# Patient Record
Sex: FEMALE | URBAN - METROPOLITAN AREA
[De-identification: names, ages, dates, MRNs, and addresses within clinical notes are randomized per-mention and may not be internally consistent; named-entity substitution may affect disease eponyms.]

---

## 2019-12-13 ENCOUNTER — APPOINTMENT (RX ONLY)
Dept: URBAN - METROPOLITAN AREA CLINIC 12 | Facility: CLINIC | Age: 24
Setting detail: DERMATOLOGY
End: 2019-12-13

## 2019-12-13 DIAGNOSIS — Z41.9 ENCOUNTER FOR PROCEDURE FOR PURPOSES OTHER THAN REMEDYING HEALTH STATE, UNSPECIFIED: ICD-10-CM

## 2019-12-13 PROCEDURE — ? BOTOX

## 2019-12-13 PROCEDURE — ? PATIENT SPECIFIC COUNSELING

## 2019-12-13 NOTE — PROCEDURE: BOTOX
Perioral Units: 0
Lot #: [id number]
Expiration Date (Month Year): 05/2022
Use Map Statement For Sites (Optional): No
Price Per Unit In $ (Use Numbers Only, No Text Please.): 17
Postcare Instructions: Patient instructed to not lie down for 4 hours and limit physical activity for 24 hours. Patient instructed not to travel by airplane for 48 hours.
Detail Level: Simple
Consent: Written consent was obtained prior to the procedure. Risks, benefits, expectations and alternatives were discussed including, but not limited to, infection, bleeding, lid/brow ptosis, bruising, swelling, diplopia, temporary effects, incomplete chemical denervation and dissatisfaction with the cosmetic outcome. No guarantee or warranty was given or implied regarding longevity of results.
Forehead Units: 7.5
Map Statment: See attached map for complete details
Bill Summary Price Listed Below, Or Bill Total Of Units X Price Per Unit?: Bill #Units x Price Per Unit
Administered By (Optional): Kristen Aguilera
Dilution (U/0.1 Cc): 5

## 2019-12-13 NOTE — HPI: COSMETIC (BOTOX)
Have You Had Botox?: has had botox
Have You Had Dysport?: has not had Dysport
Have You Had Xeomin?: has not had Xeomin
Additional History: Patient had Botox 2 months ago in the forehead but wants the lateral forehead wrinkles treated.

## 2019-12-13 NOTE — PROCEDURE: PATIENT SPECIFIC COUNSELING
Detail Level: Zone
Other (Free Text): Informed patient we can inject small amount of Botox to periorbital region to give a little brow lift (to help with minimal upper lid dermatochalasis) at future visit if desired.